# Patient Record
Sex: MALE | Race: ASIAN | NOT HISPANIC OR LATINO | URBAN - METROPOLITAN AREA
[De-identification: names, ages, dates, MRNs, and addresses within clinical notes are randomized per-mention and may not be internally consistent; named-entity substitution may affect disease eponyms.]

---

## 2018-03-30 ENCOUNTER — HOSPITAL ENCOUNTER (OUTPATIENT)
Dept: CT IMAGING | Facility: HOSPITAL | Age: 53
Discharge: HOME | End: 2018-03-30
Attending: INTERNAL MEDICINE | Admitting: RADIOLOGY

## 2018-03-30 DIAGNOSIS — J98.59 MEDIASTINAL MASS: ICD-10-CM

## 2018-03-30 LAB
HGB BLD-MCNC: 16.4 G/DL (ref 14–18)
INR PPP: 0.91 (ref 0.9–1.1)
LAB AP CHARGES (HE HISTORICAL CONVERSION): ABNORMAL
PATH REPORT.COMMENTS IMP SPEC: ABNORMAL
PATH REPORT.COMMENTS IMP SPEC: ABNORMAL
PATH REPORT.FINAL DX SPEC: ABNORMAL
PATH REPORT.MICROSCOPIC SPEC OTHER STN: ABNORMAL
PLATELET # BLD AUTO: 229 THOU/UL (ref 140–440)
RESULT FLAG (HE HISTORICAL CONVERSION): ABNORMAL

## 2018-03-30 ASSESSMENT — MIFFLIN-ST. JEOR: SCORE: 1484.44

## 2018-05-02 LAB
LAB AP CHARGES (HE HISTORICAL CONVERSION): ABNORMAL
LAB AP INITIAL CYTO EVAL (HE HISTORICAL CONVERSION): ABNORMAL
LAB MED GENERAL PATH INTERP (HE HISTORICAL CONVERSION): ABNORMAL
PATH REPORT.ADDENDUM SPEC: ABNORMAL
PATH REPORT.COMMENTS IMP SPEC: ABNORMAL
PATH REPORT.COMMENTS IMP SPEC: ABNORMAL
PATH REPORT.FINAL DX SPEC: ABNORMAL
PATH REPORT.MICROSCOPIC SPEC OTHER STN: ABNORMAL
PATH REPORT.MICROSCOPIC SPEC OTHER STN: ABNORMAL
PATH REPORT.RELEVANT HX SPEC: ABNORMAL
RESULT FLAG (HE HISTORICAL CONVERSION): ABNORMAL
SPECIMEN DESCRIPTION: ABNORMAL

## 2021-06-01 VITALS — HEIGHT: 64 IN | BODY MASS INDEX: 28.17 KG/M2 | WEIGHT: 165 LBS

## 2021-06-17 NOTE — PROCEDURES
POST PROCEDURE NOTE  CT guided anterior mediastinal mass biopsy    Post procedure Diagnosis: recurrent lymphoma    Technical Procedure: CT guided anterior mediastinal mass biopsy      Physician: Keron Navas    EBL:  Minimal    Specimens Removed:  5 cores 20g, 1fna 22g     Complications:  None.